# Patient Record
Sex: MALE | Race: WHITE | URBAN - METROPOLITAN AREA
[De-identification: names, ages, dates, MRNs, and addresses within clinical notes are randomized per-mention and may not be internally consistent; named-entity substitution may affect disease eponyms.]

---

## 2021-05-19 ENCOUNTER — APPOINTMENT (RX ONLY)
Dept: URBAN - METROPOLITAN AREA CLINIC 308 | Facility: CLINIC | Age: 81
Setting detail: DERMATOLOGY
End: 2021-05-19

## 2021-05-19 DIAGNOSIS — L12.0 BULLOUS PEMPHIGOID: ICD-10-CM

## 2021-05-19 PROBLEM — L30.9 DERMATITIS, UNSPECIFIED: Status: ACTIVE | Noted: 2021-05-19

## 2021-05-19 PROCEDURE — 11105 PUNCH BX SKIN EA SEP/ADDL: CPT

## 2021-05-19 PROCEDURE — ? ADDITIONAL NOTES

## 2021-05-19 PROCEDURE — 99214 OFFICE O/P EST MOD 30 MIN: CPT | Mod: 25

## 2021-05-19 PROCEDURE — ? PRESCRIPTION

## 2021-05-19 PROCEDURE — 11104 PUNCH BX SKIN SINGLE LESION: CPT

## 2021-05-19 PROCEDURE — ? BIOPSY BY PUNCH METHOD

## 2021-05-19 PROCEDURE — ? ORDER TESTS

## 2021-05-19 RX ORDER — DOXEPIN HYDROCHLORIDE 10 MG/1
10MG CAPSULE ORAL QHS
Qty: 30 | Refills: 1 | Status: ERX | COMMUNITY
Start: 2021-05-19

## 2021-05-19 RX ADMIN — DOXEPIN HYDROCHLORIDE 10MG: 10 CAPSULE ORAL at 00:00

## 2021-05-19 ASSESSMENT — LOCATION DETAILED DESCRIPTION DERM: LOCATION DETAILED: LEFT SUPERIOR UPPER BACK

## 2021-05-19 ASSESSMENT — LOCATION SIMPLE DESCRIPTION DERM: LOCATION SIMPLE: LEFT UPPER BACK

## 2021-05-19 ASSESSMENT — LOCATION ZONE DERM: LOCATION ZONE: TRUNK

## 2021-05-19 NOTE — HPI: ITCHING
What Type Of Note Output Would You Prefer (Optional)?: Bullet Format
Additional History: Patient states that his symptoms have severely worsened over the last 3-4 months.

## 2021-05-19 NOTE — PROCEDURE: BIOPSY BY PUNCH METHOD
Detail Level: Detailed
Was A Bandage Applied: Yes
Punch Size In Mm: 4
Biopsy Type: DIF
Anesthesia Type: 1% lidocaine with epinephrine
Anesthesia Volume In Cc (Will Not Render If 0): 0
Hemostasis: None
Epidermal Sutures: 5-0 Prolene
Wound Care: Petrolatum
Dressing: bandage
Patient Will Remove Sutures At Home?: No
Lab: 6
Lab Facility: 3
Consent: Written consent was obtained and risks were reviewed including but not limited to scarring, infection, bleeding, scabbing, incomplete removal, nerve damage and allergy to anesthesia.
Post-Care Instructions: I reviewed with the patient in detail post-care instructions. Patient is to keep the biopsy site dry overnight, and then apply bacitracin twice daily until healed. Patient may apply hydrogen peroxide soaks to remove any crusting.
Home Suture Removal Text: Patient was provided a home suture removal kit and will remove their sutures at home.  If they have any questions or difficulties they will call the office.
Notification Instructions: Patient will be notified of biopsy results. However, patient instructed to call the office if not contacted within 2 weeks.
Billing Type: Third-Party Bill
Information: Selecting Yes will display possible errors in your note based on the variables you have selected. This validation is only offered as a suggestion for you. PLEASE NOTE THAT THE VALIDATION TEXT WILL BE REMOVED WHEN YOU FINALIZE YOUR NOTE. IF YOU WANT TO FAX A PRELIMINARY NOTE YOU WILL NEED TO TOGGLE THIS TO 'NO' IF YOU DO NOT WANT IT IN YOUR FAXED NOTE.
Biopsy Type: H and E

## 2021-05-27 ENCOUNTER — APPOINTMENT (RX ONLY)
Dept: URBAN - METROPOLITAN AREA CLINIC 308 | Facility: CLINIC | Age: 81
Setting detail: DERMATOLOGY
End: 2021-05-27

## 2021-05-27 DIAGNOSIS — L50.8 OTHER URTICARIA: ICD-10-CM | Status: INADEQUATELY CONTROLLED

## 2021-05-27 PROCEDURE — 99213 OFFICE O/P EST LOW 20 MIN: CPT

## 2021-05-27 PROCEDURE — ? COUNSELING

## 2021-05-27 PROCEDURE — ? TREATMENT REGIMEN

## 2021-05-27 PROCEDURE — ? ADDITIONAL NOTES

## 2021-05-27 ASSESSMENT — LOCATION SIMPLE DESCRIPTION DERM: LOCATION SIMPLE: LEFT UPPER BACK

## 2021-05-27 ASSESSMENT — LOCATION ZONE DERM: LOCATION ZONE: TRUNK

## 2021-05-27 ASSESSMENT — LOCATION DETAILED DESCRIPTION DERM: LOCATION DETAILED: LEFT INFERIOR MEDIAL UPPER BACK

## 2021-05-27 NOTE — PROCEDURE: TREATMENT REGIMEN
Detail Level: Zone
Plan: Recommend increasing Zyrtec to BID in addition to doxapin. Can take Zyrtec up to QID. Pt denies any drowsiness.

## 2021-05-27 NOTE — PROCEDURE: COUNSELING
Patient Specific Counseling (Will Not Stick From Patient To Patient): Discussed biopsy results in detail with pt. Will rule out dermatomyositis with additional lab testing (TALIB panel sent via [employer] system). Discussed possible need to see allergist for Xolair prescription if lab results are negative. May require referral from PCP to see allergist. Patient Specific Counseling (Will Not Stick From Patient To Patient): Discussed biopsy results in detail with pt. Will rule out dermatomyositis with additional lab testing (TALIB panel sent via [employer] system). Discussed possible need to see allergist for Xolair prescription if lab results are negative. May require referral from PCP to see allergist.

## 2021-05-27 NOTE — HPI: ITCHING
What Type Of Note Output Would You Prefer (Optional)?: Bullet Format
Additional History: Pt does not feel prescription has helped his symptoms. Pt denies muscle weakness.

## 2021-05-28 ENCOUNTER — RX ONLY (OUTPATIENT)
Age: 81
Setting detail: RX ONLY
End: 2021-05-28

## 2021-05-28 RX ORDER — HYDROXYCHLOROQUINE SULFATE 200 MG/1
TABLET ORAL
Qty: 60 | Refills: 1 | Status: ERX | COMMUNITY
Start: 2021-05-28

## 2021-06-16 ENCOUNTER — APPOINTMENT (RX ONLY)
Dept: URBAN - METROPOLITAN AREA CLINIC 308 | Facility: CLINIC | Age: 81
Setting detail: DERMATOLOGY
End: 2021-06-16

## 2021-06-16 DIAGNOSIS — L30.8 OTHER SPECIFIED DERMATITIS: ICD-10-CM

## 2021-06-16 PROCEDURE — ? PRESCRIPTION

## 2021-06-16 RX ORDER — HYDROXYCHLOROQUINE SULFATE 200 MG/1
TABLET ORAL
Qty: 60 | Refills: 1 | Status: ERX

## 2021-06-22 ENCOUNTER — APPOINTMENT (RX ONLY)
Dept: URBAN - METROPOLITAN AREA CLINIC 308 | Facility: CLINIC | Age: 81
Setting detail: DERMATOLOGY
End: 2021-06-22

## 2021-06-22 DIAGNOSIS — L30.8 OTHER SPECIFIED DERMATITIS: ICD-10-CM | Status: INADEQUATELY CONTROLLED

## 2021-06-22 PROCEDURE — ? PRESCRIPTION

## 2021-06-22 PROCEDURE — 99213 OFFICE O/P EST LOW 20 MIN: CPT

## 2021-06-22 PROCEDURE — ? ADDITIONAL NOTES

## 2021-06-22 PROCEDURE — ? COUNSELING

## 2021-06-22 RX ORDER — TRIAMCINOLONE ACETONIDE 1 MG/G
CREAM TOPICAL BID
Qty: 1 | Refills: 3 | Status: ERX | COMMUNITY
Start: 2021-06-22

## 2021-06-22 RX ORDER — HYDROXYCHLOROQUINE SULFATE 200 MG/1
TABLET ORAL BID
Qty: 60 | Refills: 6 | Status: ERX

## 2021-06-22 RX ADMIN — TRIAMCINOLONE ACETONIDE: 1 CREAM TOPICAL at 00:00

## 2021-06-22 NOTE — HPI: RASH
What Type Of Note Output Would You Prefer (Optional)?: Bullet Format
Is The Patient Presenting As Previously Scheduled?: Yes
Is This A New Presentation, Or A Follow-Up?: Follow Up Rash
Additional History: Pt reports he has been on plaquenil since last visit- confusion with pharmacy that occurred previously was drug vs generic name. Pt reports he thought he had temporary relief and then a return to previous issues. Pt reports he also has lost weight since rash started. \\n\\nJuly 2019 pt went to rheumatologist for blood work etc. \\n

## 2021-06-22 NOTE — PROCEDURE: COUNSELING
Detail Level: Detailed
Patient Specific Counseling (Will Not Stick From Patient To Patient): Disc pt has spectrum of symptoms that correlate to lupus just not all together as we would expect. Disc weight loss is possibly related, in addition to itching, and past wrist joint pain.\\nRecommended plaquenil retinal exam at eye center within 6 months of starting plaquenil. \\nRecommended appt with rheumatologist to pursue further work up.

## 2021-11-11 ENCOUNTER — APPOINTMENT (RX ONLY)
Dept: URBAN - METROPOLITAN AREA CLINIC 308 | Facility: CLINIC | Age: 81
Setting detail: DERMATOLOGY
End: 2021-11-11

## 2021-11-11 DIAGNOSIS — M32.10 SYSTEMIC LUPUS ERYTHEMATOSUS, ORGAN OR SYSTEM INVOLVEMENT UNSPECIFIED: ICD-10-CM

## 2021-11-11 DIAGNOSIS — M32.10 SYSTEMIC LUPUS ERYTHEMATOSUS, ORGAN OR SYSTEM INVOLVEMENT UNSPECIFIED: ICD-10-CM | Status: WELL CONTROLLED

## 2021-11-11 PROCEDURE — ? PRESCRIPTION MEDICATION MANAGEMENT

## 2021-11-11 PROCEDURE — ? ADDITIONAL NOTES

## 2021-11-11 PROCEDURE — ? COUNSELING

## 2021-11-11 PROCEDURE — ? PRESCRIPTION

## 2021-11-11 PROCEDURE — 99214 OFFICE O/P EST MOD 30 MIN: CPT

## 2021-11-11 PROCEDURE — ? TREATMENT REGIMEN

## 2021-11-11 RX ORDER — HYDROXYCHLOROQUINE SULFATE 200 MG/1
TABLET ORAL BID
Qty: 60 | Refills: 6 | Status: CANCELLED | COMMUNITY
Start: 2021-11-11

## 2021-11-11 RX ORDER — HYDROXYCHLOROQUINE SULFATE 200 MG/1
TABLET ORAL BID
Qty: 60 | Refills: 6 | Status: ERX

## 2021-11-11 RX ADMIN — HYDROXYCHLOROQUINE SULFATE: 200 TABLET ORAL at 00:00

## 2021-11-11 NOTE — PROCEDURE: ADDITIONAL NOTES
Additional Notes: Discussed today that labs were ordered in EPIC today and pt can have labs drawn at any [facility] labs. Discussed that pt is now overdue for labs and that pt should get his labs drawn as soon as he is able to. Discussed that as labs do not need to be fasting pt could go today. Additional Notes: Discussed today that labs were ordered in EPIC today and pt can have labs drawn at any [facility] labs. Discussed that pt is now overdue for labs and that pt should get his labs drawn as soon as he is able to. Discussed that as labs do not need to be fasting pt could go today.

## 2021-11-11 NOTE — PROCEDURE: PRESCRIPTION MEDICATION MANAGEMENT
Detail Level: Zone
Plan: Continue taking Plaquenil 200 mg one tablet PO BID.\\nContinue using triamcinolone topically twice daily on affected areas until clear.
Render In Strict Bullet Format?: No

## 2021-11-11 NOTE — PROCEDURE: TREATMENT REGIMEN
Detail Level: Zone
Plan: Continue taking Plaquenil 200 mg one tablet PO BID.\\nContinue using triamcinolone topically twice daily on affected areas until clear.\\nDo not apply to armpits, groin, or face.\\n\\nRecommended pt follow up in March 2022 as he stated he is wintering in [place] this year and discussed that his blood work will be due then as well.

## 2022-04-20 ENCOUNTER — APPOINTMENT (RX ONLY)
Dept: URBAN - METROPOLITAN AREA CLINIC 308 | Facility: CLINIC | Age: 82
Setting detail: DERMATOLOGY
End: 2022-04-20

## 2022-04-20 DIAGNOSIS — L82.1 OTHER SEBORRHEIC KERATOSIS: ICD-10-CM

## 2022-04-20 DIAGNOSIS — D18.0 HEMANGIOMA: ICD-10-CM

## 2022-04-20 DIAGNOSIS — M32.10 SYSTEMIC LUPUS ERYTHEMATOSUS, ORGAN OR SYSTEM INVOLVEMENT UNSPECIFIED: ICD-10-CM

## 2022-04-20 DIAGNOSIS — L81.4 OTHER MELANIN HYPERPIGMENTATION: ICD-10-CM

## 2022-04-20 DIAGNOSIS — M32.10 SYSTEMIC LUPUS ERYTHEMATOSUS, ORGAN OR SYSTEM INVOLVEMENT UNSPECIFIED: ICD-10-CM | Status: IMPROVED

## 2022-04-20 PROBLEM — D48.5 NEOPLASM OF UNCERTAIN BEHAVIOR OF SKIN: Status: ACTIVE | Noted: 2022-04-20

## 2022-04-20 PROBLEM — D18.01 HEMANGIOMA OF SKIN AND SUBCUTANEOUS TISSUE: Status: ACTIVE | Noted: 2022-04-20

## 2022-04-20 PROBLEM — D23.5 OTHER BENIGN NEOPLASM OF SKIN OF TRUNK: Status: ACTIVE | Noted: 2022-04-20

## 2022-04-20 PROCEDURE — ? PRESCRIPTION MEDICATION MANAGEMENT

## 2022-04-20 PROCEDURE — ? BIOPSY BY SHAVE METHOD

## 2022-04-20 PROCEDURE — ? COUNSELING

## 2022-04-20 PROCEDURE — ? ADDITIONAL NOTES

## 2022-04-20 PROCEDURE — 11103 TANGNTL BX SKIN EA SEP/ADDL: CPT

## 2022-04-20 PROCEDURE — ? FULL BODY SKIN EXAM

## 2022-04-20 PROCEDURE — 99214 OFFICE O/P EST MOD 30 MIN: CPT | Mod: 25

## 2022-04-20 PROCEDURE — ? PRESCRIPTION

## 2022-04-20 PROCEDURE — 11102 TANGNTL BX SKIN SINGLE LES: CPT

## 2022-04-20 PROCEDURE — ? TREATMENT REGIMEN

## 2022-04-20 RX ORDER — HYDROXYCHLOROQUINE SULFATE 200 MG/1
TABLET ORAL BID
Qty: 180 | Refills: 6 | Status: ERX

## 2022-04-20 ASSESSMENT — LOCATION SIMPLE DESCRIPTION DERM
LOCATION SIMPLE: RIGHT SHOULDER
LOCATION SIMPLE: LEFT POSTERIOR UPPER ARM
LOCATION SIMPLE: RIGHT POSTERIOR UPPER ARM
LOCATION SIMPLE: LEFT UPPER BACK
LOCATION SIMPLE: RIGHT UPPER BACK
LOCATION SIMPLE: CHEST
LOCATION SIMPLE: UPPER BACK
LOCATION SIMPLE: POSTERIOR NECK
LOCATION SIMPLE: LEFT SHOULDER
LOCATION SIMPLE: RIGHT LOWER BACK

## 2022-04-20 ASSESSMENT — LOCATION ZONE DERM
LOCATION ZONE: NECK
LOCATION ZONE: TRUNK
LOCATION ZONE: ARM

## 2022-04-20 ASSESSMENT — LOCATION DETAILED DESCRIPTION DERM
LOCATION DETAILED: RIGHT INFERIOR MEDIAL MIDBACK
LOCATION DETAILED: RIGHT PROXIMAL POSTERIOR UPPER ARM
LOCATION DETAILED: RIGHT SUPERIOR MEDIAL UPPER BACK
LOCATION DETAILED: LEFT POSTERIOR SHOULDER
LOCATION DETAILED: MID POSTERIOR NECK
LOCATION DETAILED: RIGHT POSTERIOR SHOULDER
LOCATION DETAILED: LEFT SUPERIOR UPPER BACK
LOCATION DETAILED: LEFT PROXIMAL POSTERIOR UPPER ARM
LOCATION DETAILED: SUPERIOR THORACIC SPINE
LOCATION DETAILED: UPPER STERNUM

## 2022-04-20 NOTE — PROCEDURE: BIOPSY BY SHAVE METHOD
Detail Level: Detailed
Depth Of Biopsy: dermis
Was A Bandage Applied: Yes
Size Of Lesion In Cm: 0.9
X Size Of Lesion In Cm: 0.7
Biopsy Type: H and E
Biopsy Method: Personna blade
Anesthesia Type: 1% lidocaine with epinephrine
Anesthesia Volume In Cc (Will Not Render If 0): 0.5
Additional Anesthesia Volume In Cc (Will Not Render If 0): 0
Hemostasis: Drysol
Wound Care: Petrolatum
Dressing: bandage
Destruction After The Procedure: No
Type Of Destruction Used: Curettage
Curettage Text: The wound bed was treated with curettage after the biopsy was performed.
Cryotherapy Text: The wound bed was treated with cryotherapy after the biopsy was performed.
Electrodesiccation Text: The wound bed was treated with electrodesiccation after the biopsy was performed.
Electrodesiccation And Curettage Text: The wound bed was treated with electrodesiccation and curettage after the biopsy was performed.
Silver Nitrate Text: The wound bed was treated with silver nitrate after the biopsy was performed.
Lab: 6
Lab Facility: 3
Consent: Verbal consent was obtained after risks were reviewed including but not limited to scarring, infection, bleeding, pain scabbing, incomplete removal, nerve damage, allergy/reaction to anesthesia/cleanser/other, and possible need for further treatment including but not limited to additional/repeat biopsy, definitive treatment, and/or other procedure.
Post-Care Instructions: I reviewed with the patient in detail post-care instructions. Patient is to keep the biopsy site dry overnight, and then apply bacitracin twice daily until healed. Patient may apply hydrogen peroxide soaks to remove any crusting.
Notification Instructions: Patient will be notified of biopsy results. However, patient instructed to call the office if not contacted within 2 weeks.
Billing Type: Third-Party Bill
Information: Selecting Yes will display possible errors in your note based on the variables you have selected. This validation is only offered as a suggestion for you. PLEASE NOTE THAT THE VALIDATION TEXT WILL BE REMOVED WHEN YOU FINALIZE YOUR NOTE. IF YOU WANT TO FAX A PRELIMINARY NOTE YOU WILL NEED TO TOGGLE THIS TO 'NO' IF YOU DO NOT WANT IT IN YOUR FAXED NOTE.
X Size Of Lesion In Cm: 0.4

## 2022-04-20 NOTE — PROCEDURE: MIPS QUALITY
Quality 431: Preventive Care And Screening: Unhealthy Alcohol Use - Screening: Patient not identified as an unhealthy alcohol user when screened for unhealthy alcohol use using a systematic screening method
Quality 130: Documentation Of Current Medications In The Medical Record: Current Medications Documented
Detail Level: Detailed
Quality 110: Preventive Care And Screening: Influenza Immunization: Influenza Immunization Administered during Influenza season
Quality 226: Preventive Care And Screening: Tobacco Use: Screening And Cessation Intervention: Patient screened for tobacco use and is an ex/non-smoker
Quality 111:Pneumonia Vaccination Status For Older Adults: Pneumococcal Vaccination Previously Received

## 2022-04-20 NOTE — PROCEDURE: TREATMENT REGIMEN
Plan: Discussed pt is doing well on plaquenil but needs to stay out of the sun more or reapply sunscreen more frequently when outdoors. \\nDiscussed returning to ophthalmology for retinal specialist eye exam 1 year from his first exam as this was recommended by ophthalmology- next exam will be end of summer/ beginning of fall per pt. \\nDiscussed pt also needs routine blood work done for medication management and that we will send labs today through [employer] for pt. Plan: Discussed pt is doing well on plaquenil but needs to stay out of the sun more or reapply sunscreen more frequently when outdoors. \\nDiscussed returning to ophthalmology for retinal specialist eye exam 1 year from his first exam as this was recommended by ophthalmology- next exam will be end of summer/ beginning of fall per pt. \\nDiscussed pt also needs routine blood work done for medication management and that we will send labs today through [employer] for pt.

## 2022-04-20 NOTE — PROCEDURE: PRESCRIPTION MEDICATION MANAGEMENT
Plan: Continue taking plaquenil 200mg PO BID.
Render In Strict Bullet Format?: No
Detail Level: Zone

## 2022-10-19 ENCOUNTER — APPOINTMENT (RX ONLY)
Dept: URBAN - METROPOLITAN AREA CLINIC 308 | Facility: CLINIC | Age: 82
Setting detail: DERMATOLOGY
End: 2022-10-19

## 2022-10-19 DIAGNOSIS — L81.4 OTHER MELANIN HYPERPIGMENTATION: ICD-10-CM

## 2022-10-19 DIAGNOSIS — L82.1 OTHER SEBORRHEIC KERATOSIS: ICD-10-CM

## 2022-10-19 DIAGNOSIS — D18.0 HEMANGIOMA: ICD-10-CM

## 2022-10-19 PROBLEM — D23.5 OTHER BENIGN NEOPLASM OF SKIN OF TRUNK: Status: ACTIVE | Noted: 2022-10-19

## 2022-10-19 PROBLEM — D18.01 HEMANGIOMA OF SKIN AND SUBCUTANEOUS TISSUE: Status: ACTIVE | Noted: 2022-10-19

## 2022-10-19 PROCEDURE — ? FULL BODY SKIN EXAM

## 2022-10-19 PROCEDURE — ? TREATMENT REGIMEN

## 2022-10-19 PROCEDURE — 99213 OFFICE O/P EST LOW 20 MIN: CPT

## 2022-10-19 PROCEDURE — ? ADDITIONAL NOTES

## 2022-10-19 PROCEDURE — ? COUNSELING

## 2022-10-19 ASSESSMENT — LOCATION DETAILED DESCRIPTION DERM
LOCATION DETAILED: UPPER STERNUM
LOCATION DETAILED: SUPERIOR THORACIC SPINE
LOCATION DETAILED: LEFT SUPERIOR UPPER BACK
LOCATION DETAILED: RIGHT PROXIMAL POSTERIOR UPPER ARM
LOCATION DETAILED: LEFT POSTERIOR SHOULDER
LOCATION DETAILED: RIGHT SUPERIOR MEDIAL UPPER BACK
LOCATION DETAILED: RIGHT POSTERIOR SHOULDER
LOCATION DETAILED: LEFT PROXIMAL POSTERIOR UPPER ARM

## 2022-10-19 ASSESSMENT — LOCATION SIMPLE DESCRIPTION DERM
LOCATION SIMPLE: LEFT POSTERIOR UPPER ARM
LOCATION SIMPLE: RIGHT UPPER BACK
LOCATION SIMPLE: RIGHT SHOULDER
LOCATION SIMPLE: RIGHT POSTERIOR UPPER ARM
LOCATION SIMPLE: LEFT SHOULDER
LOCATION SIMPLE: UPPER BACK
LOCATION SIMPLE: CHEST
LOCATION SIMPLE: LEFT UPPER BACK

## 2022-10-19 ASSESSMENT — LOCATION ZONE DERM
LOCATION ZONE: ARM
LOCATION ZONE: TRUNK

## 2022-10-19 NOTE — HPI: EVALUATION OF SKIN LESION(S)
What Type Of Note Output Would You Prefer (Optional)?: Bullet Format
Hpi Title: Evaluation of Skin Lesions
Have Your Spot(S) Been Treated In The Past?: has not been treated
Additional History: Plaquenil eye exam on Aug. 2022 results are okay\\nOphtho requested to repeat in 5 years

## 2023-04-24 ENCOUNTER — APPOINTMENT (RX ONLY)
Dept: URBAN - METROPOLITAN AREA CLINIC 308 | Facility: CLINIC | Age: 83
Setting detail: DERMATOLOGY
End: 2023-04-24

## 2023-04-24 DIAGNOSIS — L81.4 OTHER MELANIN HYPERPIGMENTATION: ICD-10-CM

## 2023-04-24 DIAGNOSIS — D18.0 HEMANGIOMA: ICD-10-CM

## 2023-04-24 DIAGNOSIS — L82.1 OTHER SEBORRHEIC KERATOSIS: ICD-10-CM

## 2023-04-24 DIAGNOSIS — M32.10 SYSTEMIC LUPUS ERYTHEMATOSUS, ORGAN OR SYSTEM INVOLVEMENT UNSPECIFIED: ICD-10-CM

## 2023-04-24 DIAGNOSIS — L57.0 ACTINIC KERATOSIS: ICD-10-CM

## 2023-04-24 PROBLEM — D23.5 OTHER BENIGN NEOPLASM OF SKIN OF TRUNK: Status: ACTIVE | Noted: 2023-04-24

## 2023-04-24 PROBLEM — D18.01 HEMANGIOMA OF SKIN AND SUBCUTANEOUS TISSUE: Status: ACTIVE | Noted: 2023-04-24

## 2023-04-24 PROCEDURE — 17000 DESTRUCT PREMALG LESION: CPT

## 2023-04-24 PROCEDURE — ? COUNSELING

## 2023-04-24 PROCEDURE — 99213 OFFICE O/P EST LOW 20 MIN: CPT | Mod: 25

## 2023-04-24 PROCEDURE — ? LIQUID NITROGEN

## 2023-04-24 PROCEDURE — ? ADDITIONAL NOTES

## 2023-04-24 PROCEDURE — ? FULL BODY SKIN EXAM

## 2023-04-24 PROCEDURE — ? TREATMENT REGIMEN

## 2023-04-24 ASSESSMENT — LOCATION DETAILED DESCRIPTION DERM
LOCATION DETAILED: RIGHT POSTERIOR SHOULDER
LOCATION DETAILED: UPPER STERNUM
LOCATION DETAILED: RIGHT SUPERIOR MEDIAL UPPER BACK
LOCATION DETAILED: RIGHT PROXIMAL POSTERIOR UPPER ARM
LOCATION DETAILED: LEFT POSTERIOR SHOULDER
LOCATION DETAILED: LEFT SUPERIOR UPPER BACK
LOCATION DETAILED: EPIGASTRIC SKIN
LOCATION DETAILED: SUPERIOR THORACIC SPINE
LOCATION DETAILED: LEFT PROXIMAL POSTERIOR UPPER ARM
LOCATION DETAILED: LEFT SUPERIOR LATERAL MALAR CHEEK

## 2023-04-24 ASSESSMENT — LOCATION SIMPLE DESCRIPTION DERM
LOCATION SIMPLE: LEFT POSTERIOR UPPER ARM
LOCATION SIMPLE: RIGHT POSTERIOR UPPER ARM
LOCATION SIMPLE: LEFT UPPER BACK
LOCATION SIMPLE: LEFT SHOULDER
LOCATION SIMPLE: RIGHT SHOULDER
LOCATION SIMPLE: CHEST
LOCATION SIMPLE: RIGHT UPPER BACK
LOCATION SIMPLE: ABDOMEN
LOCATION SIMPLE: UPPER BACK
LOCATION SIMPLE: LEFT CHEEK

## 2023-04-24 ASSESSMENT — LOCATION ZONE DERM
LOCATION ZONE: ARM
LOCATION ZONE: TRUNK
LOCATION ZONE: TRUNK
LOCATION ZONE: FACE

## 2023-04-24 NOTE — PROCEDURE: TREATMENT REGIMEN
Yes...
Initiate Treatment: Recommend broad spectrum sunscreen with SPF 30 or greater daily on face and chest and more areas if outside. Reapply every 2 hours or 80 minutes if in water
Detail Level: Zone

## 2023-04-24 NOTE — PROCEDURE: LIQUID NITROGEN
Detail Level: Detailed
Show Applicator Variable?: Yes
Consent: The patient's consent was verbally obtained including but not limited to risks of crusting, scabbing, blistering, scarring, darker or lighter pigmentary change, recurrence, incomplete removal and infection.
Render Note In Bullet Format When Appropriate: No
Duration Of Freeze Thaw-Cycle (Seconds): 0
Post-Care Instructions: I reviewed with the patient in detail post-care instructions. Recommend Vaseline multiple times per daily to areas until healed, gentle cleansing recommended.

## 2023-04-24 NOTE — PROCEDURE: ADDITIONAL NOTES
Detail Level: Simple
Additional Notes: Patient consent was obtained to proceed with the visit and recommended plan of care after discussion of all risks and benefits, including the risks of COVID-19 exposure.\\n\\n
Render Risk Assessment In Note?: no
Additional Notes: Recommend pt get blood work, had recent normal CBC, CMP but will add TALIB/dsdna/ SCL70 to assess. Order in [facility]

## 2023-10-30 ENCOUNTER — APPOINTMENT (RX ONLY)
Dept: URBAN - METROPOLITAN AREA CLINIC 308 | Facility: CLINIC | Age: 83
Setting detail: DERMATOLOGY
End: 2023-10-30

## 2023-10-30 DIAGNOSIS — D18.0 HEMANGIOMA: ICD-10-CM

## 2023-10-30 DIAGNOSIS — M32.10 SYSTEMIC LUPUS ERYTHEMATOSUS, ORGAN OR SYSTEM INVOLVEMENT UNSPECIFIED: ICD-10-CM | Status: STABLE

## 2023-10-30 DIAGNOSIS — L81.4 OTHER MELANIN HYPERPIGMENTATION: ICD-10-CM

## 2023-10-30 DIAGNOSIS — L82.1 OTHER SEBORRHEIC KERATOSIS: ICD-10-CM

## 2023-10-30 PROBLEM — D18.01 HEMANGIOMA OF SKIN AND SUBCUTANEOUS TISSUE: Status: ACTIVE | Noted: 2023-10-30

## 2023-10-30 PROBLEM — D23.5 OTHER BENIGN NEOPLASM OF SKIN OF TRUNK: Status: ACTIVE | Noted: 2023-10-30

## 2023-10-30 PROCEDURE — ? ADDITIONAL NOTES

## 2023-10-30 PROCEDURE — ? FULL BODY SKIN EXAM

## 2023-10-30 PROCEDURE — ? TREATMENT REGIMEN

## 2023-10-30 PROCEDURE — ? COUNSELING

## 2023-10-30 PROCEDURE — ? PRESCRIPTION

## 2023-10-30 PROCEDURE — 99214 OFFICE O/P EST MOD 30 MIN: CPT

## 2023-10-30 RX ORDER — HYDROXYCHLOROQUINE SULFATE 200 MG/1
TABLET ORAL BID
Qty: 90 | Refills: 3 | Status: ERX

## 2023-10-30 ASSESSMENT — LOCATION SIMPLE DESCRIPTION DERM
LOCATION SIMPLE: RIGHT POSTERIOR UPPER ARM
LOCATION SIMPLE: LEFT UPPER BACK
LOCATION SIMPLE: LEFT SHOULDER
LOCATION SIMPLE: RIGHT UPPER BACK
LOCATION SIMPLE: CHEST
LOCATION SIMPLE: ABDOMEN
LOCATION SIMPLE: UPPER BACK
LOCATION SIMPLE: RIGHT SHOULDER
LOCATION SIMPLE: LEFT POSTERIOR UPPER ARM

## 2023-10-30 ASSESSMENT — LOCATION ZONE DERM
LOCATION ZONE: TRUNK
LOCATION ZONE: ARM
LOCATION ZONE: TRUNK

## 2023-10-30 ASSESSMENT — LOCATION DETAILED DESCRIPTION DERM
LOCATION DETAILED: LEFT SUPERIOR UPPER BACK
LOCATION DETAILED: LEFT POSTERIOR SHOULDER
LOCATION DETAILED: LEFT PROXIMAL POSTERIOR UPPER ARM
LOCATION DETAILED: RIGHT SUPERIOR MEDIAL UPPER BACK
LOCATION DETAILED: EPIGASTRIC SKIN
LOCATION DETAILED: RIGHT PROXIMAL POSTERIOR UPPER ARM
LOCATION DETAILED: UPPER STERNUM
LOCATION DETAILED: RIGHT POSTERIOR SHOULDER
LOCATION DETAILED: SUPERIOR THORACIC SPINE

## 2023-10-30 NOTE — PROCEDURE: TREATMENT REGIMEN
Detail Level: Zone
Initiate Treatment: Recommend broad spectrum sunscreen with SPF 30 or greater daily on face and chest and more areas if outside. Reapply every 2 hours or 80 minutes if in water
Plan: Patient seeing optometrist once yearly for eye exams

## 2024-09-05 RX ORDER — HYDROXYCHLOROQUINE SULFATE 200 MG/1
TABLET ORAL BID
Qty: 90 | Refills: 0 | Status: ERX

## 2024-11-14 ENCOUNTER — APPOINTMENT (RX ONLY)
Dept: URBAN - METROPOLITAN AREA CLINIC 308 | Facility: CLINIC | Age: 84
Setting detail: DERMATOLOGY
End: 2024-11-14

## 2024-11-14 DIAGNOSIS — Z79.899 OTHER LONG TERM (CURRENT) DRUG THERAPY: ICD-10-CM

## 2024-11-14 DIAGNOSIS — D22 MELANOCYTIC NEVI: ICD-10-CM

## 2024-11-14 DIAGNOSIS — D18.0 HEMANGIOMA: ICD-10-CM

## 2024-11-14 DIAGNOSIS — M32.10 SYSTEMIC LUPUS ERYTHEMATOSUS, ORGAN OR SYSTEM INVOLVEMENT UNSPECIFIED: ICD-10-CM

## 2024-11-14 DIAGNOSIS — L82.1 OTHER SEBORRHEIC KERATOSIS: ICD-10-CM

## 2024-11-14 DIAGNOSIS — L81.4 OTHER MELANIN HYPERPIGMENTATION: ICD-10-CM

## 2024-11-14 PROBLEM — D22.5 MELANOCYTIC NEVI OF TRUNK: Status: ACTIVE | Noted: 2024-11-14

## 2024-11-14 PROBLEM — D18.01 HEMANGIOMA OF SKIN AND SUBCUTANEOUS TISSUE: Status: ACTIVE | Noted: 2024-11-14

## 2024-11-14 PROCEDURE — ? PRESCRIPTION MEDICATION MANAGEMENT

## 2024-11-14 PROCEDURE — ? ORDER TESTS

## 2024-11-14 PROCEDURE — ? TREATMENT REGIMEN

## 2024-11-14 PROCEDURE — ? COUNSELING

## 2024-11-14 PROCEDURE — ? PRESCRIPTION

## 2024-11-14 PROCEDURE — 99214 OFFICE O/P EST MOD 30 MIN: CPT

## 2024-11-14 RX ORDER — TRIAMCINOLONE ACETONIDE 1 MG/G
OINTMENT TOPICAL
Qty: 454 | Refills: 2 | Status: ERX | COMMUNITY
Start: 2024-11-14

## 2024-11-14 RX ADMIN — TRIAMCINOLONE ACETONIDE: 1 OINTMENT TOPICAL at 00:00

## 2024-11-14 ASSESSMENT — LOCATION SIMPLE DESCRIPTION DERM
LOCATION SIMPLE: RIGHT POSTERIOR UPPER ARM
LOCATION SIMPLE: LEFT POSTERIOR UPPER ARM
LOCATION SIMPLE: LEFT UPPER BACK
LOCATION SIMPLE: RIGHT UPPER BACK
LOCATION SIMPLE: CHEST
LOCATION SIMPLE: LEFT SHOULDER
LOCATION SIMPLE: ABDOMEN
LOCATION SIMPLE: UPPER BACK
LOCATION SIMPLE: RIGHT SHOULDER

## 2024-11-14 ASSESSMENT — LOCATION DETAILED DESCRIPTION DERM
LOCATION DETAILED: RIGHT MID-UPPER BACK
LOCATION DETAILED: LEFT POSTERIOR SHOULDER
LOCATION DETAILED: RIGHT PROXIMAL POSTERIOR UPPER ARM
LOCATION DETAILED: UPPER STERNUM
LOCATION DETAILED: PERIUMBILICAL SKIN
LOCATION DETAILED: LEFT INFERIOR UPPER BACK
LOCATION DETAILED: RIGHT SUPERIOR MEDIAL UPPER BACK
LOCATION DETAILED: SUPERIOR THORACIC SPINE
LOCATION DETAILED: LEFT SUPERIOR UPPER BACK
LOCATION DETAILED: RIGHT POSTERIOR SHOULDER
LOCATION DETAILED: LEFT PROXIMAL POSTERIOR UPPER ARM

## 2024-11-14 ASSESSMENT — LOCATION ZONE DERM
LOCATION ZONE: ARM
LOCATION ZONE: TRUNK

## 2024-11-14 NOTE — PROCEDURE: PRESCRIPTION MEDICATION MANAGEMENT
Continue Regimen: Continue home supply of Triamcinolone
Detail Level: Zone
Render In Strict Bullet Format?: No
Detail Level: Generalized
Continue Regimen: Continue home supply of Plaquenil

## 2024-11-14 NOTE — PROCEDURE: ORDER TESTS
Bill For Surgical Tray: no
Performing Laboratory: 0
Billing Type: Third-Party Bill
Expected Date Of Service: 11/14/2024
Performing Laboratory: [id number]

## 2024-11-21 RX ORDER — HYDROXYCHLOROQUINE SULFATE 200 MG/1
TABLET ORAL BID
Qty: 90 | Refills: 3 | Status: ERX

## 2025-07-16 RX ORDER — HYDROXYCHLOROQUINE SULFATE 200 MG/1
TABLET ORAL BID
Qty: 90 | Refills: 3 | Status: ERX